# Patient Record
Sex: MALE | Race: WHITE
[De-identification: names, ages, dates, MRNs, and addresses within clinical notes are randomized per-mention and may not be internally consistent; named-entity substitution may affect disease eponyms.]

---

## 2018-06-19 PROBLEM — Z00.00 ENCOUNTER FOR PREVENTIVE HEALTH EXAMINATION: Status: ACTIVE | Noted: 2018-06-19

## 2018-06-20 ENCOUNTER — NON-APPOINTMENT (OUTPATIENT)
Age: 33
End: 2018-06-20

## 2018-06-20 ENCOUNTER — APPOINTMENT (OUTPATIENT)
Dept: CARDIOLOGY | Facility: CLINIC | Age: 33
End: 2018-06-20

## 2018-06-20 VITALS
HEIGHT: 71 IN | HEART RATE: 59 BPM | WEIGHT: 164 LBS | BODY MASS INDEX: 22.96 KG/M2 | SYSTOLIC BLOOD PRESSURE: 112 MMHG | DIASTOLIC BLOOD PRESSURE: 70 MMHG

## 2018-06-20 DIAGNOSIS — Z87.891 PERSONAL HISTORY OF NICOTINE DEPENDENCE: ICD-10-CM

## 2018-07-24 ENCOUNTER — APPOINTMENT (OUTPATIENT)
Dept: CARDIOLOGY | Facility: CLINIC | Age: 33
End: 2018-07-24

## 2018-07-24 VITALS
HEIGHT: 71 IN | WEIGHT: 160 LBS | OXYGEN SATURATION: 97 % | SYSTOLIC BLOOD PRESSURE: 102 MMHG | DIASTOLIC BLOOD PRESSURE: 70 MMHG | BODY MASS INDEX: 22.4 KG/M2 | HEART RATE: 81 BPM

## 2018-07-24 DIAGNOSIS — R00.1 BRADYCARDIA, UNSPECIFIED: ICD-10-CM

## 2018-07-24 DIAGNOSIS — R42 DIZZINESS AND GIDDINESS: ICD-10-CM

## 2018-08-17 ENCOUNTER — RESULT REVIEW (OUTPATIENT)
Age: 33
End: 2018-08-17

## 2018-08-17 ENCOUNTER — OUTPATIENT (OUTPATIENT)
Dept: OUTPATIENT SERVICES | Facility: HOSPITAL | Age: 33
LOS: 1 days | Discharge: HOME | End: 2018-08-17

## 2018-08-17 VITALS
HEART RATE: 53 BPM | HEIGHT: 71 IN | DIASTOLIC BLOOD PRESSURE: 66 MMHG | WEIGHT: 154.98 LBS | RESPIRATION RATE: 20 BRPM | TEMPERATURE: 98 F | SYSTOLIC BLOOD PRESSURE: 126 MMHG

## 2018-08-17 VITALS — DIASTOLIC BLOOD PRESSURE: 62 MMHG | RESPIRATION RATE: 18 BRPM | HEART RATE: 50 BPM | SYSTOLIC BLOOD PRESSURE: 117 MMHG

## 2018-08-17 NOTE — PRE-ANESTHESIA EVALUATION ADULT - NSANTHOSAYNRD_GEN_A_CORE
No. MIRZA screening performed.  STOP BANG Legend: 0-2 = LOW Risk; 3-4 = INTERMEDIATE Risk; 5-8 = HIGH Risk

## 2018-08-28 DIAGNOSIS — K92.1 MELENA: ICD-10-CM

## 2018-08-28 DIAGNOSIS — K64.4 RESIDUAL HEMORRHOIDAL SKIN TAGS: ICD-10-CM

## 2023-06-07 NOTE — ASU PATIENT PROFILE, ADULT - DOES PATIENT HAVE ADVANCE DIRECTIVE
"-- DO NOT REPLY / DO NOT REPLY ALL --  -- Message is from [facility] ([address] [facility]) --    General Patient Message: Jasen Mercedes from [facility] to confirm order for nebulizer has been canceled     Caller Information       Type Contact Phone/Fax    06/07/2023 11:39 AM CDT Phone (Incoming) Jasen Mercedes (Other) [phone]     DME        Alternative phone number: No    Can a detailed message be left? Yes    Message Turnaround:     Is it Working Hours? Yes - Working Hours     [place]:    Please give this turnaround time to the caller: ""This message will be sent to [facility] Provider's name]. The clinical team will fulfill your request as soon as they review your message. \""                " No

## 2023-11-16 NOTE — ASU PATIENT PROFILE, ADULT - NS PRO MODE OF ARRIVAL
-- DO NOT REPLY / DO NOT REPLY ALL --  -- Message is from [facility] ([facility]) --    Referral Request  Name of Specialist: Mack Mcgill  Provider's specialty: Pain Management    Medical condition for referral:  Fluid in knee    Is this a NEW request?: yes      Referral ordered by: Sylvie Way      Insurance type:       Payor:  [employer] / Plan:  BE /053 / Product Type:  MEDICARE ADVANTAGE      Preferred Delivery Method   Input in Epic and provider copy for patient to pick please notify when ready     Caller Information       Type Contact Phone/Fax    11/16/2023 09:01 AM CST Phone (Incoming) Yane Mata (Self) [phone] (M)          Alternative phone number: no    Can a detailed message be left? Yes    Please give this turnaround time to the caller:   \"This message will be sent to [state Provider's full name]. The clinical team will return your call as soon as they review your message. Typically, it takes 3 business days to process referral requests.\"   ambulatory